# Patient Record
Sex: FEMALE | Race: WHITE | NOT HISPANIC OR LATINO | Employment: FULL TIME | URBAN - METROPOLITAN AREA
[De-identification: names, ages, dates, MRNs, and addresses within clinical notes are randomized per-mention and may not be internally consistent; named-entity substitution may affect disease eponyms.]

---

## 2023-06-01 ENCOUNTER — MEDICAL CORRESPONDENCE (OUTPATIENT)
Dept: HEALTH INFORMATION MANAGEMENT | Facility: CLINIC | Age: 63
End: 2023-06-01

## 2023-06-01 ENCOUNTER — APPOINTMENT (OUTPATIENT)
Dept: MRI IMAGING | Facility: CLINIC | Age: 63
End: 2023-06-01
Attending: EMERGENCY MEDICINE
Payer: COMMERCIAL

## 2023-06-01 ENCOUNTER — APPOINTMENT (OUTPATIENT)
Dept: CT IMAGING | Facility: CLINIC | Age: 63
End: 2023-06-01
Attending: EMERGENCY MEDICINE
Payer: COMMERCIAL

## 2023-06-01 ENCOUNTER — HOSPITAL ENCOUNTER (EMERGENCY)
Facility: CLINIC | Age: 63
Discharge: HOME | End: 2023-06-02
Attending: EMERGENCY MEDICINE | Admitting: EMERGENCY MEDICINE
Payer: COMMERCIAL

## 2023-06-01 DIAGNOSIS — Z86.79 HISTORY OF HYPERTENSION: ICD-10-CM

## 2023-06-01 DIAGNOSIS — H53.9 VISUAL DISTURBANCE: ICD-10-CM

## 2023-06-01 DIAGNOSIS — R42 DIZZINESS: ICD-10-CM

## 2023-06-01 DIAGNOSIS — Z96.21 COCHLEAR IMPLANT IN PLACE: ICD-10-CM

## 2023-06-01 LAB
ALBUMIN SERPL BCG-MCNC: 4.7 G/DL (ref 3.5–5.2)
ALP SERPL-CCNC: 58 U/L (ref 35–104)
ALT SERPL W P-5'-P-CCNC: 20 U/L (ref 10–35)
ANION GAP SERPL CALCULATED.3IONS-SCNC: 13 MMOL/L (ref 7–15)
AST SERPL W P-5'-P-CCNC: 34 U/L (ref 10–35)
ATRIAL RATE - MUSE: 71 BPM
BASOPHILS # BLD AUTO: 0 10E3/UL (ref 0–0.2)
BASOPHILS NFR BLD AUTO: 1 %
BILIRUB SERPL-MCNC: 0.7 MG/DL
BUN SERPL-MCNC: 14 MG/DL (ref 8–23)
CALCIUM SERPL-MCNC: 9.6 MG/DL (ref 8.8–10.2)
CHLORIDE SERPL-SCNC: 100 MMOL/L (ref 98–107)
CREAT SERPL-MCNC: 0.95 MG/DL (ref 0.51–0.95)
DEPRECATED HCO3 PLAS-SCNC: 24 MMOL/L (ref 22–29)
DIASTOLIC BLOOD PRESSURE - MUSE: NORMAL MMHG
EOSINOPHIL # BLD AUTO: 0.1 10E3/UL (ref 0–0.7)
EOSINOPHIL NFR BLD AUTO: 3 %
ERYTHROCYTE [DISTWIDTH] IN BLOOD BY AUTOMATED COUNT: 12.6 % (ref 10–15)
GFR SERPL CREATININE-BSD FRML MDRD: 68 ML/MIN/1.73M2
GLUCOSE SERPL-MCNC: 127 MG/DL (ref 70–99)
HCT VFR BLD AUTO: 41.8 % (ref 35–47)
HGB BLD-MCNC: 14.2 G/DL (ref 11.7–15.7)
HOLD SPECIMEN: NORMAL
IMM GRANULOCYTES # BLD: 0 10E3/UL
IMM GRANULOCYTES NFR BLD: 0 %
INTERPRETATION ECG - MUSE: NORMAL
LYMPHOCYTES # BLD AUTO: 0.9 10E3/UL (ref 0.8–5.3)
LYMPHOCYTES NFR BLD AUTO: 20 %
MCH RBC QN AUTO: 31.4 PG (ref 26.5–33)
MCHC RBC AUTO-ENTMCNC: 34 G/DL (ref 31.5–36.5)
MCV RBC AUTO: 93 FL (ref 78–100)
MONOCYTES # BLD AUTO: 0.5 10E3/UL (ref 0–1.3)
MONOCYTES NFR BLD AUTO: 10 %
NEUTROPHILS # BLD AUTO: 3.1 10E3/UL (ref 1.6–8.3)
NEUTROPHILS NFR BLD AUTO: 66 %
NRBC # BLD AUTO: 0 10E3/UL
NRBC BLD AUTO-RTO: 0 /100
P AXIS - MUSE: 55 DEGREES
PLATELET # BLD AUTO: 219 10E3/UL (ref 150–450)
POTASSIUM SERPL-SCNC: 4.8 MMOL/L (ref 3.4–5.3)
PR INTERVAL - MUSE: 186 MS
PROT SERPL-MCNC: 7.6 G/DL (ref 6.4–8.3)
QRS DURATION - MUSE: 106 MS
QT - MUSE: 402 MS
QTC - MUSE: 436 MS
R AXIS - MUSE: -41 DEGREES
RBC # BLD AUTO: 4.52 10E6/UL (ref 3.8–5.2)
SODIUM SERPL-SCNC: 137 MMOL/L (ref 136–145)
SYSTOLIC BLOOD PRESSURE - MUSE: NORMAL MMHG
T AXIS - MUSE: 35 DEGREES
VENTRICULAR RATE- MUSE: 71 BPM
WBC # BLD AUTO: 4.6 10E3/UL (ref 4–11)

## 2023-06-01 PROCEDURE — 96374 THER/PROPH/DIAG INJ IV PUSH: CPT | Mod: 59

## 2023-06-01 PROCEDURE — 250N000009 HC RX 250: Performed by: EMERGENCY MEDICINE

## 2023-06-01 PROCEDURE — 99285 EMERGENCY DEPT VISIT HI MDM: CPT | Mod: 25

## 2023-06-01 PROCEDURE — 250N000011 HC RX IP 250 OP 636: Performed by: EMERGENCY MEDICINE

## 2023-06-01 PROCEDURE — 70553 MRI BRAIN STEM W/O & W/DYE: CPT

## 2023-06-01 PROCEDURE — 70498 CT ANGIOGRAPHY NECK: CPT

## 2023-06-01 PROCEDURE — 70450 CT HEAD/BRAIN W/O DYE: CPT

## 2023-06-01 PROCEDURE — 93005 ELECTROCARDIOGRAM TRACING: CPT

## 2023-06-01 PROCEDURE — 36415 COLL VENOUS BLD VENIPUNCTURE: CPT | Performed by: EMERGENCY MEDICINE

## 2023-06-01 PROCEDURE — 255N000002 HC RX 255 OP 636: Performed by: EMERGENCY MEDICINE

## 2023-06-01 PROCEDURE — 80053 COMPREHEN METABOLIC PANEL: CPT | Performed by: EMERGENCY MEDICINE

## 2023-06-01 PROCEDURE — A9585 GADOBUTROL INJECTION: HCPCS | Performed by: EMERGENCY MEDICINE

## 2023-06-01 PROCEDURE — 70496 CT ANGIOGRAPHY HEAD: CPT

## 2023-06-01 PROCEDURE — 85014 HEMATOCRIT: CPT | Performed by: EMERGENCY MEDICINE

## 2023-06-01 RX ORDER — IOPAMIDOL 755 MG/ML
75 INJECTION, SOLUTION INTRAVASCULAR ONCE
Status: COMPLETED | OUTPATIENT
Start: 2023-06-01 | End: 2023-06-01

## 2023-06-01 RX ORDER — GADOBUTROL 604.72 MG/ML
8 INJECTION INTRAVENOUS ONCE
Status: COMPLETED | OUTPATIENT
Start: 2023-06-01 | End: 2023-06-01

## 2023-06-01 RX ADMIN — GADOBUTROL 8 ML: 604.72 INJECTION INTRAVENOUS at 23:46

## 2023-06-01 RX ADMIN — SODIUM CHLORIDE 90 ML: 9 INJECTION, SOLUTION INTRAVENOUS at 19:00

## 2023-06-01 RX ADMIN — IOPAMIDOL 75 ML: 755 INJECTION, SOLUTION INTRAVENOUS at 18:59

## 2023-06-01 RX ADMIN — MIDAZOLAM 1 MG: 1 INJECTION INTRAMUSCULAR; INTRAVENOUS at 22:57

## 2023-06-01 ASSESSMENT — VISUAL ACUITY
OU: WITH CORRECTIVE LENSES;OTHER (SEE COMMENTS)
OD: WITH CORRECTIVE LENSES;20/20
OS: 20/25;WITH CORRECTIVE LENSES
OU: NORMAL

## 2023-06-01 ASSESSMENT — ACTIVITIES OF DAILY LIVING (ADL)
ADLS_ACUITY_SCORE: 35

## 2023-06-01 NOTE — ED TRIAGE NOTES
"Patient presents with complaints of having \"balance issues\" for the past three or four days, worse today.      "

## 2023-06-02 VITALS
SYSTOLIC BLOOD PRESSURE: 149 MMHG | HEART RATE: 61 BPM | OXYGEN SATURATION: 95 % | RESPIRATION RATE: 18 BRPM | TEMPERATURE: 99.8 F | DIASTOLIC BLOOD PRESSURE: 86 MMHG

## 2023-06-02 NOTE — ED PROVIDER NOTES
History   Chief Complaint:  Balance problems    HPI   History supplemented by electronic chart review    David Carrillo is a 62 year old female who presents with his wife for evaluation of 3 to 4 days of balance issues, spinning sensation, also has noticed that the vision from his left eye is not quite as good as it normally is.  No pain.  About a month and a half ago he had the post of a cochlear implant inserted in the right side of his head, in [facility] where he lives, full cochlear implant yet in place.  He went to [place] urgent care and was referred here out of concern for vertigo for further work-up.  No history of stroke or arrhythmias.  He has several beers per day.  He is here in the [facility] area for a graduation party this weekend.  Arms and legs feel fine.  No speech issues.    Independent Historian: Wife at bedside, who volunteers that they had an acquaintance who  not long ago from a brain tumor, so this is on her mind, in addition to the possibility of a stroke.    Review of External Notes: I personally performed electronic chart review, I see that he was seen at [place] urgent care today and referred here out of concern for his vertigo as well as possible facial droop.  Had cochlear implant 2023 with Dr. Pulido at [facility].     Medications:    Lisinopril  Atorvastatin 10mg    Past Medical History:    Conductive hearing loss  HTN  HLP     Social History:  Lives in [place], [place].    Physical Exam     Patient Vitals for the past 24 hrs:   BP Temp Temp src Pulse Resp SpO2   23 0018 (!) 149/86 -- -- -- 18 95 %   23 -- -- -- 61 16 99 %   23 (!) 179/93 -- -- 65 -- --   23 1759 (!) 196/101 99.8  F (37.7  C) Temporal 61 16 96 %      Physical Exam  General: Nontoxic-appearing male sitting upright, wife at bedside  HENT: mucous membranes moist, partial right-sided cochlear implant palpable and nontender in the right posterior auricular  region  Left tympanic membrane normal.  Right external ear canal with chronic appearing abnormalities consistent with prior remote surgeries.  Eyes: PERRL, EOMI, no nystagmus  Visual acuity 20/20 on the right, 20/25 in the left  CV: rate as above, regular rhythm  Resp: normal effort, speaks in full phrases, no cough observed  GI: abdomen soft and nontender, no guarding  MSK: no bony tenderness, mastoids nontender  Skin: appropriately warm and dry, no facial rash  Neuro: awake, alert, clear speech, fully oriented, no appreciable facial droop though mouth moves slightly more prominently on the left side when he talks (wife states that this is normal),  normal, finger-nose normal bilaterally, strength and sensation intact in all extr, no nuchal rigidity, ambulation not initially tested   Psych: cooperative, pleasant    Emergency Department Course     ECG results from 06/01/23   EKG 12 lead     Value    Systolic Blood Pressure     Diastolic Blood Pressure     Ventricular Rate 71    Atrial Rate 71    GA Interval 186    QRS Duration 106        QTc 436    P Axis 55    R AXIS -41    T Axis 35    Interpretation ECG      Sinus rhythm  Left axis deviation  Minimal voltage criteria for LVH, may be normal variant ( [employer] product )         Imaging:    MR Brain and Orbits w/o & w Contrast   Final Result   IMPRESSION:   HEAD MRI:   1.  No recent infarct, intracranial mass, abnormal enhancement or evidence of intracranial hemorrhage.   2.  Intracranial contents are unremarkable for age.      ORBIT MRI:   1.  Normal MRI of the orbits.       CTA Head Neck w Contrast   Final Result   IMPRESSION:    HEAD CT:   1.  No acute intracranial process.      HEAD CTA:    1.  Normal CTA [place] of June.      NECK CTA:   1.  Normal neck CTA.      CT Head w/o Contrast   Final Result   IMPRESSION:    HEAD CT:   1.  No acute intracranial process.      HEAD CTA:    1.  Normal CTA Cheesh-Na of June.      NECK CTA:   1.  Normal neck CTA.            Laboratory:  Labs Ordered and Resulted from Time of ED Arrival to Time of ED Departure   COMPREHENSIVE METABOLIC PANEL - Abnormal       Result Value    Sodium 137      Potassium 4.8      Chloride 100      Carbon Dioxide (CO2) 24      Anion Gap 13      Urea Nitrogen 14.0      Creatinine 0.95      Calcium 9.6      Glucose 127 (*)     Alkaline Phosphatase 58      AST 34      ALT 20      Protein Total 7.6      Albumin 4.7      Bilirubin Total 0.7      GFR Estimate 68     CBC WITH PLATELETS AND DIFFERENTIAL    WBC Count 4.6      RBC Count 4.52      Hemoglobin 14.2      Hematocrit 41.8      MCV 93      MCH 31.4      MCHC 34.0      RDW 12.6      Platelet Count 219      % Neutrophils 66      % Lymphocytes 20      % Monocytes 10      % Eosinophils 3      % Basophils 1      % Immature Granulocytes 0      NRBCs per 100 WBC 0      Absolute Neutrophils 3.1      Absolute Lymphocytes 0.9      Absolute Monocytes 0.5      Absolute Eosinophils 0.1      Absolute Basophils 0.0      Absolute Immature Granulocytes 0.0      Absolute NRBCs 0.0          Emergency Department Course:  Reviewed:  I reviewed nursing notes, vitals, and past medical history    Assessments/Consultations/Discussion of Management or Tests :  I obtained history and examined the patient as noted above.   ED Course as of 06/02/23 0042   Thu Jun 01, 2023 2120 I rechecked patient in room 11, dtr now at bedside.   2133 I spoke with MRI tech re: possible candidacy for MRI.   2211 I spoke with RN re: MRI   2253 I spoke with RN, Versed ordered for anxiolysis, MRI now ready for pt.   Fri Jun 02, 2023   0020 I rechecked patient in room 11.     Independent Interpretation (X-rays, CTs, rhythm strip):  I personally reviewed his head CT images, no large intracranial hemorrhage or tumor is seen    Interventions:  Medications   iopamidol (ISOVUE-370) solution 75 mL (75 mLs Intravenous $Given 6/1/23 0988)   Saline Flush (90 mLs Intravenous $Given 6/1/23 1900)   midazolam  (VERSED) injection 1 mg (1 mg Intravenous $Given 6/1/23 5133)   gadobutrol (GADAVIST) injection 8 mL (8 mLs Intravenous $Given 6/1/23 8205)      Social Determinants of Health affecting care:   Healthcare Access    Disposition:  Discharged    Impression & Plan    Medical Decision Making:  Regarding his dizziness, I considered central causes such as stroke or intracranial hemorrhage or tumor, though ultimately his detailed work-up does not reveal evidence of any acutely serious structural process.  I spoke with MRI tech and his specific model of cochlear implant was investigated to confirm that it was MRI compatible before pursuing this imaging test.  I ordered an MRI of the brain, it seems that MRI orbit was also performed, unbeknownst to me at the time of image acquisition.  Based on multiple days of symptoms, he was not a candidate for code stroke activation though angiography was performed with benign results as above.  Regarding his visual disturbance, his visual acuity is still excellent, the specific etiology is unconfirmed.  Given that it seems to be monocular, rather than simply a visual field cut, I do not think that this is likely be directly related to his presenting symptoms though cannot say for sure.  I did recommend follow-up with neurology and ophthalmology though he intends to go back to his home in [place] in the very near future so a local referral information was not provided the specific return precautions were reviewed and agreed upon.  He is ambulatory here.  No signs of major metabolic abnormality.  Highly doubt infection in the absence of meningismus, fever, or leukocytosis.  He was discharged home to his satisfaction after discussing reassurances and limitations of today's evaluation.    Diagnosis:    ICD-10-CM    1. Dizziness  R42       2. History of hypertension  Z86.79       3. Visual disturbance  H53.9       4. Cochlear implant in place  Z96.21          6/1/2023   CLARK Parrish  Bernardo Canas MD  06/02/23 0046       Bernardo Parrish MD  06/02/23 0106